# Patient Record
Sex: MALE | Race: OTHER | URBAN - METROPOLITAN AREA
[De-identification: names, ages, dates, MRNs, and addresses within clinical notes are randomized per-mention and may not be internally consistent; named-entity substitution may affect disease eponyms.]

---

## 2021-11-11 ENCOUNTER — HOSPITAL ENCOUNTER (OUTPATIENT)
Dept: LAB | Age: 41
Discharge: HOME | End: 2021-11-11

## 2021-11-11 ENCOUNTER — OFFICE VISIT (OUTPATIENT)
Dept: FAMILY MEDICINE CLINIC | Age: 41
End: 2021-11-11

## 2021-11-11 VITALS
SYSTOLIC BLOOD PRESSURE: 114 MMHG | WEIGHT: 153 LBS | OXYGEN SATURATION: 99 % | TEMPERATURE: 98.7 F | BODY MASS INDEX: 25.49 KG/M2 | HEART RATE: 56 BPM | HEIGHT: 65 IN | DIASTOLIC BLOOD PRESSURE: 72 MMHG

## 2021-11-11 DIAGNOSIS — Z00.00 WELLNESS EXAMINATION: ICD-10-CM

## 2021-11-11 DIAGNOSIS — Z00.00 WELLNESS EXAMINATION: Primary | ICD-10-CM

## 2021-11-11 DIAGNOSIS — H11.002 PTERYGIUM OF LEFT EYE: ICD-10-CM

## 2021-11-11 DIAGNOSIS — H53.8 BLURRED VISION, LEFT EYE: ICD-10-CM

## 2021-11-11 PROCEDURE — 99203 OFFICE O/P NEW LOW 30 MIN: CPT | Performed by: FAMILY MEDICINE

## 2021-11-11 PROCEDURE — 80053 COMPREHEN METABOLIC PANEL: CPT

## 2021-11-11 PROCEDURE — 84443 ASSAY THYROID STIM HORMONE: CPT

## 2021-11-11 PROCEDURE — 80061 LIPID PANEL: CPT

## 2021-11-11 PROCEDURE — 83036 HEMOGLOBIN GLYCOSYLATED A1C: CPT

## 2021-11-11 PROCEDURE — 85025 COMPLETE CBC W/AUTO DIFF WBC: CPT

## 2021-11-11 NOTE — PROGRESS NOTES
0 I have printed AVS and reviewed it with patient today. I have copied the provider's check out note here and have reviewed these items with the patient today: Return if symptoms worsen or fail to improve. I explained the process for [facility] to the patient. Patient verbalized understanding.  Tonya Mcguire RN

## 2021-11-11 NOTE — PROGRESS NOTES
HISTORY OF PRESENT ILLNESS  Molly Pimentel is a 37 y.o. male. HPI  Patient states he has a problem with the left eye. He has a fleshed color growth that causes him to have blurry vision. He also states he had a cornea injury playing basketball about 5 years ago. Would like to have labs today  Review of Systems   Constitutional: Negative for chills, fever and weight loss. Eyes: Positive for blurred vision and redness. Respiratory: Negative for cough, sputum production and shortness of breath. Cardiovascular: Negative for chest pain, palpitations and orthopnea. Gastrointestinal: Negative for abdominal pain, heartburn, nausea and vomiting. Genitourinary: Negative for dysuria, frequency and urgency. Musculoskeletal: Negative for back pain, myalgias and neck pain. Skin: Negative for itching and rash. Neurological: Negative for dizziness and headaches. /72 (BP 1 Location: Right arm, BP Patient Position: Sitting)   Pulse (!) 56   Temp 98.7 °F (37.1 °C) (Temporal)   Ht 5' 4.57\" (1.64 m)   Wt 153 lb (69.4 kg)   SpO2 99%   BMI 25.80 kg/m²   Physical Exam  Constitutional:       General: He is not in acute distress. HENT:      Head: Normocephalic. Right Ear: Tympanic membrane normal.      Left Ear: Tympanic membrane normal.      Nose: Nose normal.      Mouth/Throat:      Mouth: Mucous membranes are moist.      Pharynx: Posterior oropharyngeal erythema present. No oropharyngeal exudate. Eyes:      Extraocular Movements: Extraocular movements intact. Pupils: Pupils are equal, round, and reactive to light. Comments: Left eye pterygium   Cardiovascular:      Rate and Rhythm: Normal rate and regular rhythm. Pulses: Normal pulses. Heart sounds: Normal heart sounds. No murmur heard. Pulmonary:      Effort: Pulmonary effort is normal.      Breath sounds: No wheezing or rhonchi. Abdominal:      General: Bowel sounds are normal. There is no distension. Palpations: Abdomen is soft. Tenderness: There is no abdominal tenderness. Musculoskeletal:         General: Normal range of motion. Cervical back: Normal range of motion. Right lower leg: No edema. Left lower leg: No edema. Neurological:      Mental Status: He is alert. ASSESSMENT and PLAN  Diagnoses and all orders for this visit:    1. Wellness examination  -     CBC WITH AUTOMATED DIFF; Future  -     METABOLIC PANEL, COMPREHENSIVE; Future  -     LIPID PANEL; Future  -     HEMOGLOBIN A1C WITH EAG; Future  -     TSH 3RD GENERATION; Future    2.  Pterygium of left eye  -     REFERRAL TO OPHTHALMOLOGY    3. Blurred vision, left eye  -     REFERRAL TO OPHTHALMOLOGY      37year old here for wellness exam,    He has pterygium of the left eye with blurred vision, we will refer to ophtalmology for evaluation and management

## 2021-11-12 LAB
ALBUMIN SERPL-MCNC: 4.3 G/DL (ref 3.5–5)
ALBUMIN/GLOB SERPL: 1.3 {RATIO} (ref 1.1–2.2)
ALP SERPL-CCNC: 86 U/L (ref 45–117)
ALT SERPL-CCNC: 37 U/L (ref 12–78)
ANION GAP SERPL CALC-SCNC: 3 MMOL/L (ref 5–15)
AST SERPL-CCNC: 22 U/L (ref 15–37)
BASOPHILS # BLD: 0.1 K/UL (ref 0–0.1)
BASOPHILS NFR BLD: 1 % (ref 0–1)
BILIRUB SERPL-MCNC: 0.5 MG/DL (ref 0.2–1)
BUN SERPL-MCNC: 12 MG/DL (ref 6–20)
BUN/CREAT SERPL: 13 (ref 12–20)
CALCIUM SERPL-MCNC: 9.2 MG/DL (ref 8.5–10.1)
CHLORIDE SERPL-SCNC: 106 MMOL/L (ref 97–108)
CHOLEST SERPL-MCNC: 213 MG/DL
CO2 SERPL-SCNC: 28 MMOL/L (ref 21–32)
CREAT SERPL-MCNC: 0.89 MG/DL (ref 0.7–1.3)
DIFFERENTIAL METHOD BLD: ABNORMAL
EOSINOPHIL # BLD: 0.1 K/UL (ref 0–0.4)
EOSINOPHIL NFR BLD: 2 % (ref 0–7)
ERYTHROCYTE [DISTWIDTH] IN BLOOD BY AUTOMATED COUNT: 12.1 % (ref 11.5–14.5)
EST. AVERAGE GLUCOSE BLD GHB EST-MCNC: 100 MG/DL
GLOBULIN SER CALC-MCNC: 3.3 G/DL (ref 2–4)
GLUCOSE SERPL-MCNC: 102 MG/DL (ref 65–100)
HBA1C MFR BLD: 5.1 % (ref 4–5.6)
HCT VFR BLD AUTO: 43.5 % (ref 36.6–50.3)
HDLC SERPL-MCNC: 41 MG/DL
HDLC SERPL: 5.2 {RATIO} (ref 0–5)
HGB BLD-MCNC: 14.4 G/DL (ref 12.1–17)
IMM GRANULOCYTES # BLD AUTO: 0.1 K/UL (ref 0–0.04)
IMM GRANULOCYTES NFR BLD AUTO: 1 % (ref 0–0.5)
LDLC SERPL CALC-MCNC: 124.4 MG/DL (ref 0–100)
LYMPHOCYTES # BLD: 1.9 K/UL (ref 0.8–3.5)
LYMPHOCYTES NFR BLD: 25 % (ref 12–49)
MCH RBC QN AUTO: 30.8 PG (ref 26–34)
MCHC RBC AUTO-ENTMCNC: 33.1 G/DL (ref 30–36.5)
MCV RBC AUTO: 92.9 FL (ref 80–99)
MONOCYTES # BLD: 0.6 K/UL (ref 0–1)
MONOCYTES NFR BLD: 8 % (ref 5–13)
NEUTS SEG # BLD: 4.9 K/UL (ref 1.8–8)
NEUTS SEG NFR BLD: 63 % (ref 32–75)
NRBC # BLD: 0 K/UL (ref 0–0.01)
NRBC BLD-RTO: 0 PER 100 WBC
PLATELET # BLD AUTO: 273 K/UL (ref 150–400)
PMV BLD AUTO: 11.3 FL (ref 8.9–12.9)
POTASSIUM SERPL-SCNC: 4.1 MMOL/L (ref 3.5–5.1)
PROT SERPL-MCNC: 7.6 G/DL (ref 6.4–8.2)
RBC # BLD AUTO: 4.68 M/UL (ref 4.1–5.7)
SODIUM SERPL-SCNC: 137 MMOL/L (ref 136–145)
TRIGL SERPL-MCNC: 238 MG/DL (ref ?–150)
TSH SERPL DL<=0.05 MIU/L-ACNC: 1.78 UIU/ML (ref 0.36–3.74)
VLDLC SERPL CALC-MCNC: 47.6 MG/DL
WBC # BLD AUTO: 7.6 K/UL (ref 4.1–11.1)

## 2021-11-15 NOTE — PROGRESS NOTES
Mildly elevated cholesterol and triglycerides  Normal thyroid, normal hemoglobin a1c, no diabetes  Normal kidney function, liver function and electrolytes,   Normal blood count  Patient can be informed

## 2021-11-17 ENCOUNTER — VIRTUAL VISIT (OUTPATIENT)
Dept: FAMILY MEDICINE CLINIC | Age: 41
End: 2021-11-17

## 2021-11-17 DIAGNOSIS — Z71.89 COUNSELING AND COORDINATION OF CARE: Primary | ICD-10-CM

## 2021-11-22 ENCOUNTER — OFFICE VISIT (OUTPATIENT)
Dept: FAMILY MEDICINE CLINIC | Age: 41
End: 2021-11-22

## 2021-11-22 DIAGNOSIS — Z71.89 COUNSELING AND COORDINATION OF CARE: Primary | ICD-10-CM

## 2021-11-22 PROCEDURE — 99080 SPECIAL REPORTS OR FORMS: CPT | Performed by: FAMILY MEDICINE

## 2021-11-26 NOTE — PROGRESS NOTES
Called and spoke with patient and informed of labs results, per Dr Zoya Mitchell Mildly elevated cholesterol and triglycerides   Normal thyroid, normal hemoglobin a1c, no diabetes   Normal kidney function, liver function and electrolytes,   Normal blood count . Patient is requesting copy of labs sent to his mail.

## 2021-12-03 NOTE — PROGRESS NOTES
Contacted and spoke with patient on lab results, patient said they've already been contacted about the results this week. Patient is asking for a copy of the lab letter sent to his house, please mail a copy to patient.

## 2021-12-06 ENCOUNTER — OFFICE VISIT (OUTPATIENT)
Dept: FAMILY MEDICINE CLINIC | Age: 41
End: 2021-12-06

## 2021-12-06 DIAGNOSIS — Z71.89 COUNSELING AND COORDINATION OF CARE: Primary | ICD-10-CM

## 2021-12-06 PROCEDURE — 99080 SPECIAL REPORTS OR FORMS: CPT | Performed by: FAMILY MEDICINE

## 2021-12-06 NOTE — PROGRESS NOTES
Incomplete AN financial screening. Patient has been re-scheduled for [facility] 12/7/21 to complete process.

## 2021-12-07 ENCOUNTER — OFFICE VISIT (OUTPATIENT)
Dept: FAMILY MEDICINE CLINIC | Age: 41
End: 2021-12-07

## 2021-12-07 DIAGNOSIS — Z71.89 COUNSELING AND COORDINATION OF CARE: Primary | ICD-10-CM

## 2021-12-07 PROCEDURE — 99080 SPECIAL REPORTS OR FORMS: CPT | Performed by: FAMILY MEDICINE

## 2021-12-07 NOTE — PROGRESS NOTES
AN financial screening could not be completed. Patient was on his lunch hour. Patient has been scheduled for [facility] on 12/9/21 to complete financial screening process.

## 2021-12-09 ENCOUNTER — OFFICE VISIT (OUTPATIENT)
Dept: FAMILY MEDICINE CLINIC | Age: 41
End: 2021-12-09

## 2021-12-09 DIAGNOSIS — Z71.89 COUNSELING AND COORDINATION OF CARE: Primary | ICD-10-CM

## 2021-12-09 PROCEDURE — 99080 SPECIAL REPORTS OR FORMS: CPT | Performed by: FAMILY MEDICINE

## 2021-12-09 NOTE — PROGRESS NOTES
AN financial screening is incomplete. Patient was missing POI for wife. Patient has opted out of [facility].

## 2021-12-21 NOTE — PROGRESS NOTES
Letter created and set to the [facility] letter queue to be printed and mailed when a printer is available.  Mervat Coello RN